# Patient Record
Sex: FEMALE | Race: WHITE | URBAN - METROPOLITAN AREA
[De-identification: names, ages, dates, MRNs, and addresses within clinical notes are randomized per-mention and may not be internally consistent; named-entity substitution may affect disease eponyms.]

---

## 2017-11-21 ENCOUNTER — TRANSFERRED RECORDS (OUTPATIENT)
Dept: HEALTH INFORMATION MANAGEMENT | Facility: CLINIC | Age: 37
End: 2017-11-21

## 2017-11-21 ENCOUNTER — MEDICAL CORRESPONDENCE (OUTPATIENT)
Dept: HEALTH INFORMATION MANAGEMENT | Facility: CLINIC | Age: 37
End: 2017-11-21

## 2017-11-22 ENCOUNTER — TELEPHONE (OUTPATIENT)
Dept: MATERNAL FETAL MEDICINE | Facility: CLINIC | Age: 37
End: 2017-11-22

## 2017-11-22 NOTE — TELEPHONE ENCOUNTER
Order received this morning for pt to be seen ASAP as she is currently pregnant with di/di twins and AMA and is requesting a possible multifetal reduction. Pt called to inquire what her thoughts on timing of appointment and testing options she is considering at this time. Pt has questions surrounding CVS and different noninvasive testing that is offered for twins. Pt was called by Genetic Counselor and had questions answered. After her discussion with Quinton Perry, she decided that she would like to have a CVS done. Aware this is a service that is not provided her at [facility] at this time but she could be referred to [facility] for this. Records faxed to [facility] and this scribe talked to Carissa at [facility] with information. Pt called back and aware that [facility] will not contact her until next week to set up an appointment. Pt may have her M consult with [facility] along with her CVS as she would like to minimize her travel to the [facility]. Will decide at that time if she still wants to come to [facility] for a consult and reduction or if she is wanting to stay with [facility] and have procedure done with them. Pt has the number for the PCC line. Will call back with her plans and decision. No further questions at this time. Nayeli Argueta RN

## 2017-11-22 NOTE — TELEPHONE ENCOUNTER
11/22/2017    I called Nadege Villalobos at the request of [facility] Nayeli Argueta RN to discuss patient's questions regarding genetic testing during the first trimester of pregnancy.  Nadege has a recently confirmed twin pregnancy and is considering fetal reduction at this time.  We discussed the reasons why an individual in her situation might consider genetic testing prior to a reduction procedure, primarily to ensure that the persisting fetus does not have a chromosome abnormality.  We discussed two testing options, CVS and NIPT.  Nadege had NIPT done in her previous pregnancy, which was also a twin pregnancy, and was aware of the limitations of this technology in testing twin pregnancies.  Namely, that the testing cannot distinguish between multiple fetuses, and that a positive result would not tell which twin might be affected, also that the sensitivity of the testing is not as well established in twin pregnancies, so it is possible for the testing to miss an affected fetus and return false negative results.  The alternative, CVS, is a diagnostic test that assesses each fetus individually.  CVS is an invasive procedure that does have a risk for miscarriage associated with it.  Nadege expressed understanding of all of this information and that she would like to proceed with a CVS.  We discussed that we would refer her to [facility] to arrange for this procedure.  Please see [facility] note for further details. I encouraged Nadege to reach out to me with any questions or concerns throughout this process and provided her with my contact information.      Quinton Perry MS, [facility]  Certified Genetic Counselor  Phone: [phone]  Pager: [phone]

## 2017-11-27 ENCOUNTER — TELEPHONE (OUTPATIENT)
Dept: MATERNAL FETAL MEDICINE | Facility: CLINIC | Age: 37
End: 2017-11-27

## 2017-11-27 NOTE — TELEPHONE ENCOUNTER
[facility] called and wanted to clarify the order for Nadege Villalobos.   [facility] WILL NOT do reduction from twins to mesa, but they would do the CVS.  Writer told [facility] staff that she will call pt and let pt know that pt may go to [facility] for CVS, but they don't do reductions of twins.  Pt would need to come to [facility] for reduction consult and procedure. Pt was called and informed of this information and will call back [facility] once she is scheduled for her CVS at [facility].  Regina Mosley RN

## 2017-12-01 ENCOUNTER — TELEPHONE (OUTPATIENT)
Dept: MATERNAL FETAL MEDICINE | Facility: CLINIC | Age: 37
End: 2017-12-01

## 2017-12-01 NOTE — TELEPHONE ENCOUNTER
Phone call to pt to confirm her appointment with [facility] and [facility]. Pt had called yesterday and stated that she is scheduled for her CVS on 12/13 at [facility] at 9:30 and would like to have her consult with [facility] on the same day if she could due to her travel. Phone call made to [facility] and consult ok received by Dr. Stein. Dr. Stein will see pt on that same day for a consult prior to her leaving town. Records faxed to Javier torre [facility] who will facilitate consult for pt. Pt states she has no further questions. Will wait until after procedure, the results and consult prior to making any further arrangements for termination. Has [facility] number for further questions. Nayeli Argueta RN

## 2017-12-12 ENCOUNTER — TRANSFERRED RECORDS (OUTPATIENT)
Dept: HEALTH INFORMATION MANAGEMENT | Facility: CLINIC | Age: 37
End: 2017-12-12

## 2017-12-13 ENCOUNTER — TRANSFERRED RECORDS (OUTPATIENT)
Dept: HEALTH INFORMATION MANAGEMENT | Facility: CLINIC | Age: 37
End: 2017-12-13

## 2017-12-28 ENCOUNTER — DOCUMENTATION ONLY (OUTPATIENT)
Dept: MATERNAL FETAL MEDICINE | Facility: CLINIC | Age: 37
End: 2017-12-28

## 2017-12-28 ENCOUNTER — TELEPHONE (OUTPATIENT)
Dept: MATERNAL FETAL MEDICINE | Facility: CLINIC | Age: 37
End: 2017-12-28

## 2017-12-28 DIAGNOSIS — O30.002 TWIN GESTATION IN SECOND TRIMESTER, UNSPECIFIED MULTIPLE GESTATION TYPE: Primary | ICD-10-CM

## 2017-12-28 NOTE — TELEPHONE ENCOUNTER
Phone call to pt re CVS results from [facility] and pt desires going forward re pregnancy. Pt states she would like to go forward with reduction the sooner the better for her. Pt aware that this scribe will have to work with Dr. Stein and schedule to call her with time and date. Pt also aware she will be called to complete a 24 hour consent prior to the procedure taking place. Pt has the number for PCC line if further questions arise. Nayeli Argueta RN

## 2017-12-28 NOTE — PROGRESS NOTES
Phone call from Sadia Pastrana GC from [facility] with verbal results of Nadege's CVS results. Wondering where to fax results to and if pt will be called to set up appointment or if she needs to call to make appointment. Confirmed fax number with Sadia Joseph. Will have schedulers call to schedule pt appropriately. Nayeli Argueta RN

## 2017-12-29 ENCOUNTER — PRE VISIT (OUTPATIENT)
Dept: MATERNAL FETAL MEDICINE | Facility: CLINIC | Age: 37
End: 2017-12-29

## 2018-01-03 ENCOUNTER — OFFICE VISIT (OUTPATIENT)
Dept: MATERNAL FETAL MEDICINE | Facility: CLINIC | Age: 38
End: 2018-01-03
Attending: OBSTETRICS & GYNECOLOGY
Payer: COMMERCIAL

## 2018-01-03 ENCOUNTER — HOSPITAL ENCOUNTER (OUTPATIENT)
Dept: ULTRASOUND IMAGING | Facility: CLINIC | Age: 38
Discharge: HOME | End: 2018-01-03
Attending: OBSTETRICS & GYNECOLOGY | Admitting: OBSTETRICS & GYNECOLOGY
Payer: COMMERCIAL

## 2018-01-03 DIAGNOSIS — Z67.91 RH NEGATIVE STATE IN ANTEPARTUM PERIOD: ICD-10-CM

## 2018-01-03 DIAGNOSIS — O26.899 RH NEGATIVE STATE IN ANTEPARTUM PERIOD: ICD-10-CM

## 2018-01-03 DIAGNOSIS — O30.002 TWIN GESTATION IN SECOND TRIMESTER, UNSPECIFIED MULTIPLE GESTATION TYPE: ICD-10-CM

## 2018-01-03 DIAGNOSIS — O31.30X0 FETAL REDUCTION OF MULTIPLE FETUSES REDUCED TO SINGLE FETUS, ANTEPARTUM: Primary | ICD-10-CM

## 2018-01-03 DIAGNOSIS — O30.042 DICHORIONIC DIAMNIOTIC TWIN PREGNANCY IN SECOND TRIMESTER: ICD-10-CM

## 2018-01-03 LAB
BLD PROD TYP BPU: NORMAL
NUM BPU REQUESTED: 1

## 2018-01-03 PROCEDURE — 76815 OB US LIMITED FETUS(S): CPT

## 2018-01-03 PROCEDURE — 59866 ABORTION (MPR): CPT | Performed by: OBSTETRICS & GYNECOLOGY

## 2018-01-03 PROCEDURE — 96372 THER/PROPH/DIAG INJ SC/IM: CPT | Mod: ZF

## 2018-01-03 PROCEDURE — 25000128 H RX IP 250 OP 636

## 2018-01-03 NOTE — NURSING NOTE
Pt here for multifetal pregnancy reduction d/t twin pregnancy.  After consent signed and TimeOut completed, Sammie Angel and Sarita proceeded with procedure- see their documentation. Discharge teaching completed and questions answered.  Pt discharged ambulatory and stable.  Patient reports no pain.  Pt is RH negative.  Rhogam was given today at 1125.  LOT#:  [id number], EXP [id number] (administration note faxed to [facility]).

## 2018-01-03 NOTE — MR AVS SNAPSHOT
"              After Visit Summary   1/3/2018    Skylar Villalobos    MRN: [id number]           Patient Information     Date Of Birth          1980        Visit Information        Provider Department      1/3/2018 10:45 AM Regina Angel MD [facility] Maternal Fetal Medicine [facility]        Today's Diagnoses     Fetal reduction of multiple fetuses reduced to single fetus, antepartum    -  1    Dichorionic diamniotic twin pregnancy in second trimester        Rh negative state in antepartum period           Follow-ups after your visit        Who to contact     If you have questions or need follow up information about today's clinic visit or your schedule please contact [facility] MATERNAL FETAL MEDICINE [facility] directly at [phone].  Normal or non-critical lab and imaging results will be communicated to you by MyChart, letter or phone within 4 business days after the clinic has received the results. If you do not hear from us within 7 days, please contact the clinic through [employer] or phone. If you have a critical or abnormal lab result, we will notify you by phone as soon as possible.  Submit refill requests through [employer] or call your pharmacy and they will forward the refill request to us. Please allow 3 business days for your refill to be completed.          Additional Information About Your Visit        MyChart Information     CheckInPage lets you send messages to your doctor, view your test results, renew your prescriptions, schedule appointments and more. To sign up, go to [email or web address] . Click on \"Log in\" on the left side of the screen, which will take you to the Welcome page. Then click on \"Sign up Now\" on the right side of the page.     You will be asked to enter the access code listed below, as well as some personal information. Please follow the directions to create your username and password.     Your access code is: [email or web address]  Expires: 4/4/2018  8:55 AM     Your access code will "  in 90 days. If you need help or a new code, please call your [place] clinic or [phone].        Care EveryWhere ID     This is your Care EveryWhere ID. This could be used by other organizations to access your [place] medical records  [id number]        Your Vitals Were     Last Period                   2017            Blood Pressure from Last 3 Encounters:   No data found for BP    Weight from Last 3 Encounters:   No data found for Wt              We Performed the Following     OBTAIN AFFIRMATION OF INFORMED CONSENT     Transfuse Rhogam          Today's Medication Changes          These changes are accurate as of: 1/3/18 11:59 PM.  If you have any questions, ask your nurse or doctor.               Start taking these medicines.        Dose/Directions    rho(D) immune globulin 300 MCG injection   Commonly known as:  HYPERRHO/RHOGAM   Used for:  Rh negative state in antepartum period        Dose:  300 mcg   Inject 300 mcg into the muscle once for 1 dose   Quantity:  1 each   Refills:  0            Where to get your medicines      Some of these will need a paper prescription and others can be bought over the counter.  Ask your nurse if you have questions.     You don't need a prescription for these medications     rho(D) immune globulin 300 MCG injection                Primary Care Provider Office Phone # Fax #    Amy [facility] [phone]       [address] [place], [place] [id number]        Equal Access to Services     DANNA DEGROOT AH: Art yousifo Sotannerali, waaxda luqadaha, qaybta kaalmada adeegyada, spencer wharton. So [facility] [phone].    ATENCIÓN: Si habla español, tiene a mtz disposición servicios gratuitos de asistencia lingüística. Llame al [phone].    We comply with applicable federal civil rights laws and [place] laws. We do not discriminate on the basis of race, color, national origin, age, disability, sex, sexual orientation,  or gender identity.            Thank you!     Thank you for choosing [facility]  for your care. Our goal is always to provide you with excellent care. Hearing back from our patients is one way we can continue to improve our services. Please take a few minutes to complete the written survey that you may receive in the mail after your visit with us. Thank you!             Your Updated Medication List - Protect others around you: Learn how to safely use, store and throw away your medicines at www.disposemymeds.org.          This list is accurate as of: 1/3/18 11:59 PM.  Always use your most recent med list.                   Brand Name Dispense Instructions for use Diagnosis    rho(D) immune globulin 300 MCG injection    HYPERRHO/RHOGAM    1 each    Inject 300 mcg into the muscle once for 1 dose    Rh negative state in antepartum period

## 2018-01-04 NOTE — PROGRESS NOTES
Please see ultrasound report under imaging tab for details on ultrasound performed today.    Regina Angel MD  , OB/GYN  Maternal-Fetal Medicine  [email or web address]  [phone] (Academic office)  [phone] (Pager)